# Patient Record
Sex: FEMALE | Race: WHITE | Employment: UNEMPLOYED | URBAN - NONMETROPOLITAN AREA
[De-identification: names, ages, dates, MRNs, and addresses within clinical notes are randomized per-mention and may not be internally consistent; named-entity substitution may affect disease eponyms.]

---

## 2017-09-11 ENCOUNTER — TELEPHONE (OUTPATIENT)
Dept: FAMILY MEDICINE CLINIC | Age: 62
End: 2017-09-11

## 2017-09-11 DIAGNOSIS — Z85.850 HISTORY OF THYROID CANCER: ICD-10-CM

## 2017-09-11 DIAGNOSIS — I10 ESSENTIAL HYPERTENSION: Primary | ICD-10-CM

## 2017-09-11 DIAGNOSIS — Z13.1 SCREENING FOR DIABETES MELLITUS: ICD-10-CM

## 2017-09-15 PROBLEM — I10 ESSENTIAL HYPERTENSION: Status: ACTIVE | Noted: 2017-09-15

## 2017-09-15 PROBLEM — Z85.850 HISTORY OF THYROID CANCER: Status: ACTIVE | Noted: 2017-09-15

## 2017-09-22 ENCOUNTER — TELEPHONE (OUTPATIENT)
Dept: FAMILY MEDICINE CLINIC | Age: 62
End: 2017-09-22

## 2017-09-26 ENCOUNTER — HOSPITAL ENCOUNTER (OUTPATIENT)
Dept: LAB | Age: 62
Setting detail: SPECIMEN
Discharge: HOME | End: 2017-09-26
Payer: COMMERCIAL

## 2017-09-26 DIAGNOSIS — Z85.850 HISTORY OF THYROID CANCER: ICD-10-CM

## 2017-09-26 DIAGNOSIS — Z13.1 SCREENING FOR DIABETES MELLITUS: ICD-10-CM

## 2017-09-26 DIAGNOSIS — I10 ESSENTIAL HYPERTENSION: ICD-10-CM

## 2017-09-26 LAB
ANION GAP SERPL CALCULATED.3IONS-SCNC: 15 MMOL/L (ref 9–17)
BUN BLDV-MCNC: 15 MG/DL (ref 8–23)
BUN/CREAT BLD: 27 (ref 9–20)
CALCIUM SERPL-MCNC: 9.6 MG/DL (ref 8.6–10.4)
CHLORIDE BLD-SCNC: 102 MMOL/L (ref 98–107)
CO2: 28 MMOL/L (ref 20–31)
CREAT SERPL-MCNC: 0.56 MG/DL (ref 0.5–0.9)
GFR AFRICAN AMERICAN: >60 ML/MIN
GFR NON-AFRICAN AMERICAN: >60 ML/MIN
GFR SERPL CREATININE-BSD FRML MDRD: ABNORMAL ML/MIN/{1.73_M2}
GFR SERPL CREATININE-BSD FRML MDRD: ABNORMAL ML/MIN/{1.73_M2}
GLUCOSE BLD-MCNC: 139 MG/DL (ref 70–99)
POTASSIUM SERPL-SCNC: 4 MMOL/L (ref 3.7–5.3)
SODIUM BLD-SCNC: 145 MMOL/L (ref 135–144)
THYROXINE, FREE: 1.3 NG/DL (ref 0.93–1.7)
TSH SERPL DL<=0.05 MIU/L-ACNC: 5 MIU/L (ref 0.3–5)

## 2017-09-26 PROCEDURE — 36415 COLL VENOUS BLD VENIPUNCTURE: CPT

## 2017-09-26 PROCEDURE — 84439 ASSAY OF FREE THYROXINE: CPT

## 2017-09-26 PROCEDURE — 84432 ASSAY OF THYROGLOBULIN: CPT

## 2017-09-26 PROCEDURE — 84443 ASSAY THYROID STIM HORMONE: CPT

## 2017-09-26 PROCEDURE — 80048 BASIC METABOLIC PNL TOTAL CA: CPT

## 2017-09-27 LAB — THYROGLOBULIN: 0.2 NG/ML (ref 0–63.4)

## 2017-10-09 VITALS
HEART RATE: 72 BPM | WEIGHT: 279 LBS | BODY MASS INDEX: 44.84 KG/M2 | SYSTOLIC BLOOD PRESSURE: 132 MMHG | HEIGHT: 66 IN | DIASTOLIC BLOOD PRESSURE: 82 MMHG

## 2017-10-09 DIAGNOSIS — C73 MALIGNANT NEOPLASM OF THYROID GLAND (HCC): ICD-10-CM

## 2017-10-09 DIAGNOSIS — E87.6 HYPOKALEMIA: ICD-10-CM

## 2017-10-09 PROBLEM — E03.9 HYPOTHYROIDISM: Status: ACTIVE | Noted: 2017-10-09

## 2017-10-09 RX ORDER — LEVOTHYROXINE SODIUM 0.12 MG/1
125 TABLET ORAL DAILY
COMMUNITY
End: 2017-10-11 | Stop reason: DRUGHIGH

## 2017-10-09 RX ORDER — FERROUS SULFATE 325(65) MG
325 TABLET ORAL DAILY
COMMUNITY

## 2017-10-09 RX ORDER — LISINOPRIL 20 MG/1
20 TABLET ORAL DAILY
COMMUNITY
End: 2017-10-11 | Stop reason: SDUPTHER

## 2017-10-09 RX ORDER — ASCORBIC ACID 500 MG
500 TABLET ORAL DAILY
COMMUNITY
End: 2020-10-21

## 2017-10-11 ENCOUNTER — OFFICE VISIT (OUTPATIENT)
Dept: FAMILY MEDICINE CLINIC | Age: 62
End: 2017-10-11
Payer: COMMERCIAL

## 2017-10-11 VITALS
HEART RATE: 100 BPM | SYSTOLIC BLOOD PRESSURE: 154 MMHG | DIASTOLIC BLOOD PRESSURE: 88 MMHG | BODY MASS INDEX: 44.55 KG/M2 | WEIGHT: 276 LBS

## 2017-10-11 DIAGNOSIS — C73 MALIGNANT NEOPLASM OF THYROID GLAND (HCC): ICD-10-CM

## 2017-10-11 DIAGNOSIS — E89.0 POSTOPERATIVE HYPOTHYROIDISM: Primary | ICD-10-CM

## 2017-10-11 DIAGNOSIS — I10 ESSENTIAL HYPERTENSION: ICD-10-CM

## 2017-10-11 PROCEDURE — 99214 OFFICE O/P EST MOD 30 MIN: CPT | Performed by: FAMILY MEDICINE

## 2017-10-11 RX ORDER — LISINOPRIL 20 MG/1
20 TABLET ORAL DAILY
Qty: 90 TABLET | Refills: 3 | Status: SHIPPED | OUTPATIENT
Start: 2017-10-11 | End: 2018-09-22 | Stop reason: SDUPTHER

## 2017-10-11 RX ORDER — LEVOTHYROXINE SODIUM 0.12 MG/1
125 TABLET ORAL DAILY
Qty: 90 TABLET | Refills: 1 | Status: CANCELLED | OUTPATIENT
Start: 2017-10-11

## 2017-10-11 RX ORDER — LEVOTHYROXINE SODIUM 137 MCG
137 TABLET ORAL DAILY
Qty: 90 TABLET | Refills: 3 | Status: SHIPPED | OUTPATIENT
Start: 2017-10-11 | End: 2018-09-22 | Stop reason: SDUPTHER

## 2017-10-11 ASSESSMENT — ENCOUNTER SYMPTOMS
BLURRED VISION: 0
SHORTNESS OF BREATH: 0

## 2017-10-11 NOTE — PROGRESS NOTES
Review of Systems   Cardiovascular: Negative for chest pain, palpitations and leg swelling. Says her BP is up d/t concerned about grandsons health   Musculoskeletal: Negative for myalgias. Neurological: Negative for dizziness.      Checks BP at home with wrist cuff, prior to appt 112/70

## 2017-10-11 NOTE — PROGRESS NOTES
 aspirin 81 MG tablet Take 81 mg by mouth daily      Handicap Placard MISC by Does not apply route      Ascorbic Acid (VITAMIN C) 500 MG tablet Take 500 mg by mouth daily      ferrous sulfate 325 (65 Fe) MG tablet Take 325 mg by mouth daily       No current facility-administered medications for this visit. No Known Allergies    Health Maintenance   Topic Date Due    Hepatitis C screen  1955    HIV screen  04/03/1968    Cervical cancer screen  04/03/1974    Lipid screen  04/03/1993    Breast cancer screen  04/03/2003    Colon cancer screen colonoscopy  04/03/2003    DTaP/Tdap/Td vaccine (2 - Td) 07/13/2022    Zostavax vaccine  Completed    Flu vaccine  Completed       Subjective:      Review of Systems   Constitutional: Positive for malaise/fatigue. Eyes: Negative for blurred vision. Respiratory: Negative for shortness of breath. Cardiovascular: Negative for chest pain and palpitations. Musculoskeletal: Negative for neck pain. Neurological: Negative for headaches. Cardiovascular: Negative for chest pain, palpitations and leg swelling. Says her BP is up d/t concerned about grandsons health   Musculoskeletal: Negative for myalgias. Neurological: Negative for dizziness. Objective:     BP (!) 154/88   Pulse 100   Wt 276 lb (125.2 kg)   BMI 44.55 kg/m²     Physical Exam   Constitutional: She is oriented to person, place, and time. She appears well-developed and well-nourished. HENT:   Head: Normocephalic and atraumatic. Eyes: Conjunctivae and EOM are normal.   Neck: Normal range of motion. Neck supple. No JVD present. No thyromegaly present. Cardiovascular: Normal rate, regular rhythm and intact distal pulses. Exam reveals no gallop and no friction rub. No murmur heard. Pulmonary/Chest: Effort normal and breath sounds normal. No respiratory distress. Abdominal: Soft. Musculoskeletal: She exhibits edema.    Trace edema of the ankles   Lymphadenopathy: She has no cervical adenopathy. Neurological: She is alert and oriented to person, place, and time. Skin: Skin is warm. Psychiatric: She has a normal mood and affect. Her behavior is normal. Judgment and thought content normal.   Nursing note and vitals reviewed. Assessment/Plan:     1. Postoperative hypothyroidism  TSH without Reflex    T4, Free   2. Malignant neoplasm of thyroid gland ([address]     3. Essential hypertension  Will monitor her blood work and let us know if not continuing in the controleld range. Address wellness at the [place] to medicare visit. Did have cherry syrup that morning before her labs-- was NOT fasting. Blood sugar (random) was 139. Lab Results   Component Value Date     (H) 09/26/2017    K 4.0 09/26/2017     09/26/2017    CREATININE 0.56 09/26/2017    BUN 15 09/26/2017    CO2 28 09/26/2017    TSH 5.00 09/26/2017     Thyroglobulin is well in the normal level    Return in about 6 months (around 4/11/2018) for welcome to medicare. Patient given educational materials - see patient instructions. Discussed use, benefit, and side effects of prescribed medications. All patient questions answered. Pt voiced understanding. Reviewed health maintenance. Instructed to continue current medications, diet and exercise. Patient agreed with treatment plan. Follow up as directed.      Electronically signed by Jay Soliz MD on 10/11/2017

## 2018-09-24 RX ORDER — LEVOTHYROXINE SODIUM 137 MCG
TABLET ORAL
Qty: 90 TABLET | Refills: 3 | Status: SHIPPED | OUTPATIENT
Start: 2018-09-24 | End: 2018-10-17 | Stop reason: DRUGHIGH

## 2018-09-24 RX ORDER — LISINOPRIL 20 MG/1
TABLET ORAL
Qty: 90 TABLET | Refills: 3 | Status: SHIPPED | OUTPATIENT
Start: 2018-09-24 | End: 2019-09-16 | Stop reason: SDUPTHER

## 2018-10-02 ENCOUNTER — HOSPITAL ENCOUNTER (OUTPATIENT)
Dept: LAB | Age: 63
Setting detail: SPECIMEN
Discharge: HOME | End: 2018-10-02
Payer: MEDICARE

## 2018-10-02 DIAGNOSIS — E89.0 POSTOPERATIVE HYPOTHYROIDISM: ICD-10-CM

## 2018-10-02 LAB
THYROXINE, FREE: 1.35 NG/DL (ref 0.93–1.7)
TSH SERPL DL<=0.05 MIU/L-ACNC: 2.07 MIU/L (ref 0.3–5)

## 2018-10-02 PROCEDURE — 36415 COLL VENOUS BLD VENIPUNCTURE: CPT

## 2018-10-02 PROCEDURE — 84443 ASSAY THYROID STIM HORMONE: CPT

## 2018-10-02 PROCEDURE — 84439 ASSAY OF FREE THYROXINE: CPT

## 2018-10-17 ENCOUNTER — OFFICE VISIT (OUTPATIENT)
Dept: FAMILY MEDICINE CLINIC | Age: 63
End: 2018-10-17
Payer: MEDICARE

## 2018-10-17 VITALS
OXYGEN SATURATION: 97 % | BODY MASS INDEX: 44.87 KG/M2 | HEART RATE: 116 BPM | DIASTOLIC BLOOD PRESSURE: 84 MMHG | WEIGHT: 278 LBS | SYSTOLIC BLOOD PRESSURE: 146 MMHG

## 2018-10-17 DIAGNOSIS — I10 ESSENTIAL HYPERTENSION: ICD-10-CM

## 2018-10-17 DIAGNOSIS — Z13.1 ENCOUNTER FOR SCREENING EXAMINATION FOR IMPAIRED GLUCOSE REGULATION AND DIABETES MELLITUS: ICD-10-CM

## 2018-10-17 DIAGNOSIS — Z85.850 HISTORY OF THYROID CANCER: ICD-10-CM

## 2018-10-17 DIAGNOSIS — E89.0 POSTOPERATIVE HYPOTHYROIDISM: Primary | ICD-10-CM

## 2018-10-17 PROCEDURE — 1123F ACP DISCUSS/DSCN MKR DOCD: CPT | Performed by: FAMILY MEDICINE

## 2018-10-17 PROCEDURE — 1101F PT FALLS ASSESS-DOCD LE1/YR: CPT | Performed by: FAMILY MEDICINE

## 2018-10-17 PROCEDURE — 1090F PRES/ABSN URINE INCON ASSESS: CPT | Performed by: FAMILY MEDICINE

## 2018-10-17 PROCEDURE — 1036F TOBACCO NON-USER: CPT | Performed by: FAMILY MEDICINE

## 2018-10-17 PROCEDURE — G8427 DOCREV CUR MEDS BY ELIG CLIN: HCPCS | Performed by: FAMILY MEDICINE

## 2018-10-17 PROCEDURE — 99214 OFFICE O/P EST MOD 30 MIN: CPT | Performed by: FAMILY MEDICINE

## 2018-10-17 PROCEDURE — G8482 FLU IMMUNIZE ORDER/ADMIN: HCPCS | Performed by: FAMILY MEDICINE

## 2018-10-17 PROCEDURE — G8417 CALC BMI ABV UP PARAM F/U: HCPCS | Performed by: FAMILY MEDICINE

## 2018-10-17 PROCEDURE — 4040F PNEUMOC VAC/ADMIN/RCVD: CPT | Performed by: FAMILY MEDICINE

## 2018-10-17 PROCEDURE — G8400 PT W/DXA NO RESULTS DOC: HCPCS | Performed by: FAMILY MEDICINE

## 2018-10-17 PROCEDURE — 3017F COLORECTAL CA SCREEN DOC REV: CPT | Performed by: FAMILY MEDICINE

## 2018-10-17 RX ORDER — LEVOTHYROXINE SODIUM 0.15 MG/1
150 TABLET ORAL DAILY
Qty: 90 TABLET | Refills: 3 | Status: SHIPPED | OUTPATIENT
Start: 2018-10-17 | End: 2019-10-03 | Stop reason: SDUPTHER

## 2018-10-17 ASSESSMENT — ENCOUNTER SYMPTOMS
WHEEZING: 0
CONSTIPATION: 0
SHORTNESS OF BREATH: 0
COUGH: 0
ABDOMINAL PAIN: 0
DIARRHEA: 0

## 2018-10-17 ASSESSMENT — PATIENT HEALTH QUESTIONNAIRE - PHQ9
SUM OF ALL RESPONSES TO PHQ QUESTIONS 1-9: 0
SUM OF ALL RESPONSES TO PHQ QUESTIONS 1-9: 0
SUM OF ALL RESPONSES TO PHQ9 QUESTIONS 1 & 2: 0
2. FEELING DOWN, DEPRESSED OR HOPELESS: 0
1. LITTLE INTEREST OR PLEASURE IN DOING THINGS: 0

## 2018-10-17 NOTE — PROGRESS NOTES
Review of Systems   Respiratory: Negative for cough, shortness of breath and wheezing. Cardiovascular: Negative for chest pain, palpitations and leg swelling. Gastrointestinal: Negative for abdominal pain, constipation and diarrhea. Neurological: Negative for dizziness and headaches. denies any skin changes or changes in hair.
Dispense Refill    levothyroxine (SYNTHROID) 150 MCG tablet Take 1 tablet by mouth Daily 90 tablet 3    lisinopril (PRINIVIL;ZESTRIL) 20 MG tablet take 1 tablet by mouth once daily 90 tablet 3    Flaxseed, Linseed, (GROUND FLAX SEEDS PO) Take by mouth daily      Omega-3 Fatty Acids (FISH OIL PO) Take 1 capsule by mouth daily      aspirin 81 MG tablet Take 81 mg by mouth daily      Handicap Placard MISC by Does not apply route      Ascorbic Acid (VITAMIN C) 500 MG tablet Take 500 mg by mouth daily      ferrous sulfate 325 (65 Fe) MG tablet Take 325 mg by mouth daily       No current facility-administered medications for this visit. No Known Allergies    Health Maintenance   Topic Date Due    Hepatitis C screen  1955    HIV screen  04/03/1968    Cervical cancer screen  04/03/1974    Lipid screen  04/03/1993    Breast cancer screen  04/03/2003    Colon cancer screen colonoscopy  04/03/2003    Shingles Vaccine (1 of 2 - 2 Dose Series) 11/30/2015    DEXA (modify frequency per FRAX score)  04/03/2018    Pneumococcal low/med risk (2 of 2 - PPSV23) 04/03/2018    Flu vaccine (1) 09/01/2018    Potassium monitoring  09/26/2018    Creatinine monitoring  09/26/2018    TSH testing  10/02/2019    DTaP/Tdap/Td vaccine (2 - Td) 07/13/2022       Subjective:      Review of Systems   Constitutional: Negative for malaise/fatigue. Eyes: Negative for blurred vision. Respiratory: Negative for shortness of breath. Cardiovascular: Negative for chest pain, palpitations, orthopnea and PND. Musculoskeletal: Negative for neck pain. Neurological: Negative for headaches. Respiratory: Negative for cough, shortness of breath and wheezing. Cardiovascular: Negative for chest pain, palpitations and leg swelling. Gastrointestinal: Negative for abdominal pain, constipation and diarrhea. Neurological: Negative for dizziness and headaches.      Objective:     BP (!) 146/84   Pulse 116   Wt 278 lb

## 2018-10-21 ASSESSMENT — ENCOUNTER SYMPTOMS
SHORTNESS OF BREATH: 0
BLURRED VISION: 0
ORTHOPNEA: 0

## 2019-09-16 DIAGNOSIS — I10 ESSENTIAL HYPERTENSION: Primary | ICD-10-CM

## 2019-09-16 RX ORDER — LISINOPRIL 20 MG/1
TABLET ORAL
Qty: 90 TABLET | Refills: 3 | Status: SHIPPED | OUTPATIENT
Start: 2019-09-16 | End: 2020-08-31

## 2019-09-24 ENCOUNTER — HOSPITAL ENCOUNTER (OUTPATIENT)
Dept: LAB | Age: 64
Setting detail: SPECIMEN
Discharge: HOME | End: 2019-09-24
Payer: MEDICARE

## 2019-09-24 DIAGNOSIS — Z85.850 HISTORY OF THYROID CANCER: ICD-10-CM

## 2019-09-24 DIAGNOSIS — I10 ESSENTIAL HYPERTENSION: ICD-10-CM

## 2019-09-24 DIAGNOSIS — E89.0 POSTOPERATIVE HYPOTHYROIDISM: ICD-10-CM

## 2019-09-24 DIAGNOSIS — Z13.1 ENCOUNTER FOR SCREENING EXAMINATION FOR IMPAIRED GLUCOSE REGULATION AND DIABETES MELLITUS: ICD-10-CM

## 2019-09-24 LAB
ANION GAP SERPL CALCULATED.3IONS-SCNC: 13 MMOL/L (ref 9–17)
BUN BLDV-MCNC: 15 MG/DL (ref 8–23)
BUN/CREAT BLD: 24 (ref 9–20)
CALCIUM SERPL-MCNC: 9.4 MG/DL (ref 8.6–10.4)
CHLORIDE BLD-SCNC: 101 MMOL/L (ref 98–107)
CO2: 28 MMOL/L (ref 20–31)
CREAT SERPL-MCNC: 0.63 MG/DL (ref 0.5–0.9)
GFR AFRICAN AMERICAN: >60 ML/MIN
GFR NON-AFRICAN AMERICAN: >60 ML/MIN
GFR SERPL CREATININE-BSD FRML MDRD: ABNORMAL ML/MIN/{1.73_M2}
GFR SERPL CREATININE-BSD FRML MDRD: ABNORMAL ML/MIN/{1.73_M2}
GLUCOSE BLD-MCNC: 111 MG/DL (ref 70–99)
POTASSIUM SERPL-SCNC: 4.2 MMOL/L (ref 3.7–5.3)
SODIUM BLD-SCNC: 142 MMOL/L (ref 135–144)
THYROXINE, FREE: 1.62 NG/DL (ref 0.93–1.7)
TSH SERPL DL<=0.05 MIU/L-ACNC: 0.69 MIU/L (ref 0.3–5)

## 2019-09-24 PROCEDURE — 36415 COLL VENOUS BLD VENIPUNCTURE: CPT

## 2019-09-24 PROCEDURE — 84443 ASSAY THYROID STIM HORMONE: CPT

## 2019-09-24 PROCEDURE — 80048 BASIC METABOLIC PNL TOTAL CA: CPT

## 2019-09-24 PROCEDURE — 84432 ASSAY OF THYROGLOBULIN: CPT

## 2019-09-24 PROCEDURE — 84439 ASSAY OF FREE THYROXINE: CPT

## 2019-09-25 LAB — THYROGLOBULIN: <0.2 NG/ML (ref 0–63.4)

## 2019-09-26 ENCOUNTER — TELEPHONE (OUTPATIENT)
Dept: FAMILY MEDICINE CLINIC | Age: 64
End: 2019-09-26

## 2019-09-26 NOTE — TELEPHONE ENCOUNTER
Patient called requesting values of her thyroid tests \" I put them in my book. I know I have an appt in [facility]" Gave them as requested due to being in the normal range. All questions addressed at this time.

## 2019-12-11 DIAGNOSIS — E89.0 POSTOPERATIVE HYPOTHYROIDISM: ICD-10-CM

## 2019-12-11 DIAGNOSIS — Z85.850 HISTORY OF THYROID CANCER: ICD-10-CM

## 2019-12-12 RX ORDER — LEVOTHYROXINE SODIUM 0.15 MG/1
TABLET ORAL
Qty: 90 TABLET | Refills: 0 | Status: SHIPPED | OUTPATIENT
Start: 2019-12-12 | End: 2019-12-30 | Stop reason: SDUPTHER

## 2019-12-30 DIAGNOSIS — Z85.850 HISTORY OF THYROID CANCER: ICD-10-CM

## 2019-12-30 DIAGNOSIS — E89.0 POSTOPERATIVE HYPOTHYROIDISM: ICD-10-CM

## 2019-12-30 RX ORDER — LEVOTHYROXINE SODIUM 0.15 MG/1
TABLET ORAL
Qty: 90 TABLET | Refills: 0 | Status: SHIPPED | OUTPATIENT
Start: 2019-12-30 | End: 2020-03-13

## 2020-03-13 RX ORDER — LEVOTHYROXINE SODIUM 0.15 MG/1
TABLET ORAL
Qty: 90 TABLET | Refills: 0 | Status: SHIPPED | OUTPATIENT
Start: 2020-03-13 | End: 2020-06-08

## 2020-06-08 RX ORDER — LEVOTHYROXINE SODIUM 0.15 MG/1
TABLET ORAL
Qty: 30 TABLET | Refills: 0 | Status: SHIPPED | OUTPATIENT
Start: 2020-06-08 | End: 2020-09-29

## 2020-06-11 RX ORDER — LISINOPRIL 20 MG/1
TABLET ORAL
Qty: 90 TABLET | Refills: 3 | OUTPATIENT
Start: 2020-06-11

## 2020-06-11 NOTE — TELEPHONE ENCOUNTER
Patient has not been seen in almost 2 years-- an appt in 4 months is not acceptable. Will have been 2 years at that point. Can do a virtual visit if that is more acceptable to the patient.

## 2020-09-29 RX ORDER — LEVOTHYROXINE SODIUM 0.15 MG/1
TABLET ORAL
Qty: 30 TABLET | Refills: 0 | Status: SHIPPED | OUTPATIENT
Start: 2020-09-29 | End: 2020-10-21 | Stop reason: SDUPTHER

## 2020-09-29 RX ORDER — LISINOPRIL 20 MG/1
TABLET ORAL
Qty: 30 TABLET | Refills: 0 | Status: SHIPPED | OUTPATIENT
Start: 2020-09-29 | End: 2020-10-21 | Stop reason: SDUPTHER

## 2020-09-29 NOTE — TELEPHONE ENCOUNTER
Alberto Michael is requesting a refill on the following medication(s):  Requested Prescriptions     Pending Prescriptions Disp Refills    levothyroxine (SYNTHROID) 150 MCG tablet [Pharmacy Med Name: SYNTHROID 150 MCG TABLET] 30 tablet 0     Sig: take 1 tablet by mouth once daily    lisinopril (PRINIVIL;ZESTRIL) 20 MG tablet [Pharmacy Med Name: LISINOPRIL 20 MG TABLET] 30 tablet 0     Sig: take 1 tablet by mouth once daily       Last Visit Date (If Applicable):  02/48/0471    Next Visit Date:    10/21/2020

## 2020-10-21 ENCOUNTER — VIRTUAL VISIT (OUTPATIENT)
Dept: FAMILY MEDICINE CLINIC | Age: 65
End: 2020-10-21
Payer: MEDICARE

## 2020-10-21 PROBLEM — E66.01 MORBIDLY OBESE (HCC): Status: ACTIVE | Noted: 2020-10-21

## 2020-10-21 PROCEDURE — G8421 BMI NOT CALCULATED: HCPCS | Performed by: FAMILY MEDICINE

## 2020-10-21 PROCEDURE — G8427 DOCREV CUR MEDS BY ELIG CLIN: HCPCS | Performed by: FAMILY MEDICINE

## 2020-10-21 PROCEDURE — 1090F PRES/ABSN URINE INCON ASSESS: CPT | Performed by: FAMILY MEDICINE

## 2020-10-21 PROCEDURE — 1123F ACP DISCUSS/DSCN MKR DOCD: CPT | Performed by: FAMILY MEDICINE

## 2020-10-21 PROCEDURE — 99214 OFFICE O/P EST MOD 30 MIN: CPT | Performed by: FAMILY MEDICINE

## 2020-10-21 PROCEDURE — 3017F COLORECTAL CA SCREEN DOC REV: CPT | Performed by: FAMILY MEDICINE

## 2020-10-21 PROCEDURE — G8400 PT W/DXA NO RESULTS DOC: HCPCS | Performed by: FAMILY MEDICINE

## 2020-10-21 PROCEDURE — 4040F PNEUMOC VAC/ADMIN/RCVD: CPT | Performed by: FAMILY MEDICINE

## 2020-10-21 PROCEDURE — G8482 FLU IMMUNIZE ORDER/ADMIN: HCPCS | Performed by: FAMILY MEDICINE

## 2020-10-21 PROCEDURE — 4004F PT TOBACCO SCREEN RCVD TLK: CPT | Performed by: FAMILY MEDICINE

## 2020-10-21 PROCEDURE — 99212 OFFICE O/P EST SF 10 MIN: CPT | Performed by: FAMILY MEDICINE

## 2020-10-21 RX ORDER — LEVOTHYROXINE SODIUM 0.15 MG/1
TABLET ORAL
Qty: 30 TABLET | Refills: 0 | Status: SHIPPED | OUTPATIENT
Start: 2020-10-21 | End: 2020-11-16

## 2020-10-21 RX ORDER — LISINOPRIL 20 MG/1
TABLET ORAL
Qty: 90 TABLET | Refills: 3 | Status: SHIPPED | OUTPATIENT
Start: 2020-10-21 | End: 2021-10-04

## 2020-10-21 ASSESSMENT — PATIENT HEALTH QUESTIONNAIRE - PHQ9
2. FEELING DOWN, DEPRESSED OR HOPELESS: 0
SUM OF ALL RESPONSES TO PHQ QUESTIONS 1-9: 0
1. LITTLE INTEREST OR PLEASURE IN DOING THINGS: 0
SUM OF ALL RESPONSES TO PHQ QUESTIONS 1-9: 0
SUM OF ALL RESPONSES TO PHQ9 QUESTIONS 1 & 2: 0
SUM OF ALL RESPONSES TO PHQ QUESTIONS 1-9: 0

## 2020-10-21 NOTE — PROGRESS NOTES
mouth once daily Yes Sarah Albright MD   Flaxseed, Linseed, (GROUND FLAX SEEDS PO) Take by mouth daily Yes Historical Provider, MD   Omega-3 Fatty Acids (FISH OIL PO) Take 1 capsule by mouth daily Yes Historical Provider, MD   aspirin 81 MG tablet Take 81 mg by mouth daily Yes Historical Provider, MD   ferrous sulfate 325 (65 Fe) MG tablet Take 325 mg by mouth daily Yes Historical Provider, MD   Handicap Placard MISC by Does not apply route  Historical Provider, MD       Social History     Tobacco Use    Smoking status: Never Smoker    Smokeless tobacco: Never Used   Substance Use Topics    Alcohol use: No    Drug use: Not on file        No Known Allergies,   Past Medical History:   Diagnosis Date    Essential hypertension 9/15/2017    H/O fracture of right hip     History of thyroid cancer 9/15/2017    Hypothyroidism    ,   Past Surgical History:   Procedure Laterality Date    THYROIDECTOMY     ,   Social History     Tobacco Use    Smoking status: Never Smoker    Smokeless tobacco: Never Used   Substance Use Topics    Alcohol use: No    Drug use: Not on file   , No family history on file.,   Immunization History   Administered Date(s) Administered    Hepatitis A Adult (Havrix, Vaqta) 08/24/2018, 03/12/2019    Hepatitis A Ped/Adol (Havrix, Vaqta) 10/17/2018, 03/12/2019    Hepatitis B 01/17/2001, 02/14/2001, 07/18/2001    Influenza Virus Vaccine 08/16/2012, 08/09/2016, 08/10/2017    Influenza, High Dose (Fluzone 65 yrs and older) 10/17/2018, 08/15/2019, 08/18/2020    Influenza, [place], IM, PF (6 mo and older Fluzone, Flulaval, Fluarix, and 3 yrs and older Afluria) 08/05/2015, 08/10/2017    Influenza, Quadv, adjuvanted, 65 yrs +, IM, PF (Fluad) 08/18/2020    Influenza, Triv, inactivated, subunit, adjuvanted, IM (Fluad 65 yrs and older) 08/24/2018, 08/15/2019    MMR 03/26/2014    Pneumococcal Conjugate 13-valent ([id number]) 02/03/2015    Pneumococcal Polysaccharide ([id number]) 01/27/2010    Td vaccine (adult) 04/25/2001    Tdap (Boostrix, Adacel) 07/13/2012, 08/15/2019    Zoster Live (Zostavax) 09/30/2015    Zoster Recombinant (Shingrix) 03/20/2020, 06/16/2020   ,   Health Maintenance   Topic Date Due    Hepatitis C screen  1955    Lipid screen  04/03/1993    Breast cancer screen  04/03/2003    Colon cancer screen colonoscopy  04/03/2003    DEXA (modify frequency per FRAX score)  04/03/2008    Pneumococcal 65+ years Vaccine (2 of 2 - PPSV23) 04/03/2018    Annual Wellness Visit (AWV)  06/23/2019    TSH testing  09/24/2020    Potassium monitoring  09/24/2020    Creatinine monitoring  09/24/2020    DTaP/Tdap/Td vaccine (3 - Td) 08/15/2029    Flu vaccine  Completed    Shingles Vaccine  Completed    Hepatitis A vaccine  Aged Out    Hepatitis B vaccine  Aged Out    Hib vaccine  Aged Out    Meningococcal (ACWY) vaccine  Aged Out       PHYSICAL EXAMINATION:  [ INSTRUCTIONS:  \"[x]\" Indicates a positive item  \"[]\" Indicates a negative item  -- DELETE ALL ITEMS NOT EXAMINED]  Vital Signs: (As obtained by patient/caregiver or practitioner observation)  Patient-Reported Vitals 10/21/2020   Patient-Reported Systolic 758   Patient-Reported Diastolic 70     HR usually in the normal range also. Constitutional: [x] Appears well-developed and well-nourished [x] No apparent distress      [] Abnormal-   Mental status  [x] Alert and awake  [x] Oriented to person/place/time [x]Able to follow commands      Eyes:  EOM    [x]  Normal  [] Abnormal-  Sclera  [x]  Normal  [] Abnormal -         Discharge [x]  None visible  [] Abnormal -    HENT:   [x] Normocephalic, atraumatic.   [] Abnormal   [x] Mouth/Throat: Mucous membranes are moist.     External Ears [x] Normal  [] Abnormal-     Neck: [x] No visualized mass     Pulmonary/Chest: [x] Respiratory effort normal.  [x] No visualized signs of difficulty breathing or respiratory distress        [] Abnormal-      Musculoskeletal:   [x] Normal gait with no signs of ataxia         [x] Normal range of motion of neck        [] Abnormal-       Neurological:        [x] No Facial Asymmetry (Cranial nerve 7 motor function) (limited exam to video visit)          [x] No gaze palsy        [] Abnormal-         Skin:        [x] No significant exanthematous lesions or discoloration noted on facial skin         [] Abnormal-            Psychiatric:       [x] Normal Affect [x] No Hallucinations        [] Abnormal-     Other pertinent observable physical exam findings-     Due to this being a TeleHealth encounter, evaluation of the following organ systems is limited: Vitals/Constitutional/EENT/Resp/CV/GI//MS/Neuro/Skin/Heme-Lymph-Imm. ASSESSMENT/PLAN:  1. Malignant neoplasm of thyroid gland (HCC)  Check the screening labs and continue on the suppressive thyroid supplement    2. Morbidly obese ([address]  Encouraged to continue with the regular diet and exercise    3. Essential hypertension  Well controlled at this time. No change in her medications. Continue with the diet and exercise  - Comprehensive Metabolic Panel; Future  - CBC Auto Differential; Future  - lisinopril (PRINIVIL;ZESTRIL) 20 MG tablet; take 1 tablet by mouth once daily  Dispense: 90 tablet; Refill: 3    4. History of thyroid cancer    - Thyroglobulin; Future  - TSH; Future  - T4, Free; Future  - levothyroxine (SYNTHROID) 150 MCG tablet; take 1 tablet by mouth once daily  Dispense: 30 tablet; Refill: 0    5. Postoperative hypothyroidism  Continue and monitor for level with blood work  - levothyroxine (SYNTHROID) 150 MCG tablet; take 1 tablet by mouth once daily  Dispense: 30 tablet; Refill: 0    6. Hypokalemia  Recheck with the CMP at this time  - Comprehensive Metabolic Panel; Future    7. Screening, lipid    - Lipid Panel; Future    8. Encounter for lipid screening for cardiovascular disease    - Lipid Panel; Future      Return in about 6 months (around 4/21/2021) for HTN, Medication recheck, AWV.     An  electronic

## 2020-10-25 ASSESSMENT — ENCOUNTER SYMPTOMS
BLURRED VISION: 0
SHORTNESS OF BREATH: 0
ORTHOPNEA: 0

## 2020-11-16 NOTE — TELEPHONE ENCOUNTER
Sukhdev Williamson is requesting a refill on the following medication(s):  Requested Prescriptions     Pending Prescriptions Disp Refills    levothyroxine (SYNTHROID) 150 MCG tablet [Pharmacy Med Name: SYNTHROID 150 MCG TABLET] 30 tablet 0     Sig: take 1 tablet by mouth once daily       Last Visit Date (If Applicable):  71/38/4532    Next Visit Date:    Visit date not found

## 2020-11-17 RX ORDER — LEVOTHYROXINE SODIUM 0.15 MG/1
TABLET ORAL
Qty: 30 TABLET | Refills: 5 | Status: SHIPPED | OUTPATIENT
Start: 2020-11-17 | End: 2021-05-12

## 2021-05-13 ENCOUNTER — TELEPHONE (OUTPATIENT)
Dept: FAMILY MEDICINE CLINIC | Age: 66
End: 2021-05-13

## 2021-05-13 DIAGNOSIS — Z85.850 HISTORY OF THYROID CANCER: ICD-10-CM

## 2021-05-13 DIAGNOSIS — E89.0 POSTOPERATIVE HYPOTHYROIDISM: ICD-10-CM

## 2021-05-13 RX ORDER — LEVOTHYROXINE SODIUM 0.15 MG/1
TABLET ORAL
Qty: 90 TABLET | Refills: 1 | Status: CANCELLED | OUTPATIENT
Start: 2021-05-13

## 2021-05-13 NOTE — TELEPHONE ENCOUNTER
Grey Koroma is requesting a refill on the following medication(s):  Requested Prescriptions     Pending Prescriptions Disp Refills    levothyroxine (SYNTHROID) 150 MCG tablet 90 tablet 1     Sig: take 1 tablet by mouth every morning       Last Visit Date (If Applicable):  82/09/6465    Next Visit Date:    Visit date not found

## 2021-05-16 NOTE — TELEPHONE ENCOUNTER
Refill request is already been approved previously. Please make sure the patient is getting her labs done. We have no results on her thyroid labs since 2019. All labs are in the chart.   She can get these done in [place] or here at our office or at the hospital whichever she finds easiest.

## 2021-06-28 NOTE — TELEPHONE ENCOUNTER
Doug Neely is requesting a refill on the following medication(s):  Requested Prescriptions     Pending Prescriptions Disp Refills    Handicap Placard MISC 1 each 0     Sig: by Does not apply route EXP: 6/28/2024       Last Visit Date (If Applicable):  71/13/1230    Next Visit Date:    Visit date not found

## 2021-07-13 ENCOUNTER — TELEPHONE (OUTPATIENT)
Dept: FAMILY MEDICINE CLINIC | Age: 66
End: 2021-07-13

## 2021-10-02 DIAGNOSIS — I10 ESSENTIAL HYPERTENSION: ICD-10-CM

## 2021-10-04 RX ORDER — LISINOPRIL 20 MG/1
TABLET ORAL
Qty: 30 TABLET | Refills: 0 | Status: SHIPPED | OUTPATIENT
Start: 2021-10-04 | End: 2021-11-15

## 2021-10-04 NOTE — TELEPHONE ENCOUNTER
Please call patient to schedule an appointment. This needs to be an in person appt and patient must get her labs done as well.

## 2021-10-04 NOTE — TELEPHONE ENCOUNTER
Jessy Todd is requesting a refill on the following medication(s):  Requested Prescriptions     Pending Prescriptions Disp Refills    lisinopril (PRINIVIL;ZESTRIL) 20 MG tablet [Pharmacy Med Name: LISINOPRIL 20 MG TABLET] 90 tablet 3     Sig: take 1 tablet by mouth once daily       Last Visit Date (If Applicable):  76/55/4571    Next Visit Date:    Visit date not found

## 2021-10-27 NOTE — TELEPHONE ENCOUNTER
Patient notified- she states she is going to come in for lab work and will schedule an appointment. Advised that 30 day supply was sent to the pharmacy and that she needs appointment prior to further refills.

## 2021-11-12 LAB
ALBUMIN/GLOBULIN RATIO: 1.5 G/DL
ALBUMIN: 4.3 G/DL (ref 3.5–5)
ALP BLD-CCNC: 60 UNITS/L (ref 38–126)
ALT SERPL-CCNC: 16 UNITS/L (ref 4–35)
ANION GAP SERPL CALCULATED.3IONS-SCNC: 8.7 MMOL/L
AST SERPL-CCNC: 30 UNITS/L (ref 14–36)
BASOPHILS %: 1.23 (ref 0–3)
BASOPHILS ABSOLUTE: 0.06 (ref 0–0.3)
BILIRUB SERPL-MCNC: 0.7 MG/DL (ref 0.2–1.3)
BUN BLDV-MCNC: 21 MG/DL (ref 7–17)
CALCIUM SERPL-MCNC: 9.1 MG/DL (ref 8.4–10.2)
CHLORIDE BLD-SCNC: 105 MMOL/L (ref 98–120)
CHOLESTEROL/HDL RATIO: 4.35 RATIO (ref 0–4.5)
CHOLESTEROL: 248 MG/DL (ref 50–200)
CO2: 27 MMOL/L (ref 22–31)
CREAT SERPL-MCNC: 0.6 MG/DL (ref 0.5–1)
EOSINOPHILS %: 1.49 (ref 0–10)
EOSINOPHILS ABSOLUTE: 0.08 (ref 0–1.1)
GFR CALCULATED: > 60
GLOBULIN: 2.9 G/DL
GLUCOSE: 111 MG/DL (ref 65–105)
HCT VFR BLD CALC: 47.5 % (ref 37–47)
HDLC SERPL-MCNC: 57 MG/DL (ref 36–68)
HEMOGLOBIN: 15.6 (ref 12–16)
LDL CHOLESTEROL CALCULATED: 161.4 MG/DL (ref 0–160)
LYMPHOCYTE %: 19.15 (ref 20–51.1)
LYMPHOCYTES ABSOLUTE: 0.99 (ref 1–5.5)
MCH RBC QN AUTO: 28.6 PG (ref 28.5–32.5)
MCHC RBC AUTO-ENTMCNC: 32.8 G/DL (ref 32–37)
MCV RBC AUTO: 87.3 FL (ref 80–94)
MONOCYTES %: 6.83 (ref 1.7–9.3)
MONOCYTES ABSOLUTE: 0.35 (ref 0.1–1)
NEUTROPHILS %: 71.31 (ref 42.2–75.2)
NEUTROPHILS ABSOLUTE: 3.7 (ref 2–8.1)
PDW BLD-RTO: 12.4 % (ref 8.5–15.5)
PLATELET # BLD: 227.2 THOU/MM3 (ref 130–400)
POTASSIUM SERPL-SCNC: 4.3 MMOL/L (ref 3.6–5)
RBC: 5.43 M/UL (ref 4.2–5.4)
SODIUM BLD-SCNC: 141 MMOL/L (ref 135–145)
T4 FREE: 1.61 NG/DL (ref 0.78–2.19)
TOTAL PROTEIN, SERUM: 7.2 G/DL (ref 6.3–8.2)
TRIGL SERPL-MCNC: 148 MG/DL (ref 10–250)
TSH SERPL DL<=0.05 MIU/L-ACNC: 0.33 MIU/ML (ref 0.49–4.67)
VLDLC SERPL CALC-MCNC: 30 MG/DL (ref 0–50)
WBC: 5.2 THOU/ML3 (ref 4.8–10.8)

## 2021-11-15 LAB — THYROGLOBULIN: <0.2 NG/ML (ref 0–63.4)

## 2021-11-25 DIAGNOSIS — Z85.850 HISTORY OF THYROID CANCER: ICD-10-CM

## 2021-11-25 DIAGNOSIS — E89.0 POSTOPERATIVE HYPOTHYROIDISM: ICD-10-CM

## 2021-11-29 RX ORDER — LEVOTHYROXINE SODIUM 0.15 MG/1
TABLET ORAL
Qty: 30 TABLET | Refills: 0 | Status: SHIPPED | OUTPATIENT
Start: 2021-11-29 | End: 2021-12-02 | Stop reason: SDUPTHER

## 2021-11-29 NOTE — TELEPHONE ENCOUNTER
Kriss Cantu is requesting a refill on the following medication(s):  Requested Prescriptions     Pending Prescriptions Disp Refills    levothyroxine (SYNTHROID) 150 MCG tablet [Pharmacy Med Name: SYNTHROID 150 MCG TABLET] 30 tablet 0     Sig: take 1 tablet by mouth every morning       Last Visit Date (If Applicable):  58/62/9256    Next Visit Date:    12/2/2021

## 2021-12-02 ENCOUNTER — VIRTUAL VISIT (OUTPATIENT)
Dept: FAMILY MEDICINE CLINIC | Age: 66
End: 2021-12-02
Payer: MEDICARE

## 2021-12-02 DIAGNOSIS — E89.0 POSTOPERATIVE HYPOTHYROIDISM: Primary | ICD-10-CM

## 2021-12-02 DIAGNOSIS — E87.6 HYPOKALEMIA: ICD-10-CM

## 2021-12-02 DIAGNOSIS — C73 MALIGNANT NEOPLASM OF THYROID GLAND (HCC): ICD-10-CM

## 2021-12-02 DIAGNOSIS — Z85.850 HISTORY OF THYROID CANCER: ICD-10-CM

## 2021-12-02 DIAGNOSIS — I10 ESSENTIAL HYPERTENSION: ICD-10-CM

## 2021-12-02 PROCEDURE — 1123F ACP DISCUSS/DSCN MKR DOCD: CPT | Performed by: FAMILY MEDICINE

## 2021-12-02 PROCEDURE — G8400 PT W/DXA NO RESULTS DOC: HCPCS | Performed by: FAMILY MEDICINE

## 2021-12-02 PROCEDURE — 99213 OFFICE O/P EST LOW 20 MIN: CPT | Performed by: FAMILY MEDICINE

## 2021-12-02 PROCEDURE — 3017F COLORECTAL CA SCREEN DOC REV: CPT | Performed by: FAMILY MEDICINE

## 2021-12-02 PROCEDURE — G8427 DOCREV CUR MEDS BY ELIG CLIN: HCPCS | Performed by: FAMILY MEDICINE

## 2021-12-02 PROCEDURE — 99214 OFFICE O/P EST MOD 30 MIN: CPT | Performed by: FAMILY MEDICINE

## 2021-12-02 PROCEDURE — 1090F PRES/ABSN URINE INCON ASSESS: CPT | Performed by: FAMILY MEDICINE

## 2021-12-02 PROCEDURE — 4040F PNEUMOC VAC/ADMIN/RCVD: CPT | Performed by: FAMILY MEDICINE

## 2021-12-02 RX ORDER — LEVOTHYROXINE SODIUM 0.15 MG/1
TABLET ORAL
Qty: 90 TABLET | Refills: 3 | Status: SHIPPED | OUTPATIENT
Start: 2021-12-02 | End: 2022-10-23 | Stop reason: SDUPTHER

## 2021-12-02 SDOH — ECONOMIC STABILITY: TRANSPORTATION INSECURITY
IN THE PAST 12 MONTHS, HAS THE LACK OF TRANSPORTATION KEPT YOU FROM MEDICAL APPOINTMENTS OR FROM GETTING MEDICATIONS?: NO

## 2021-12-02 SDOH — ECONOMIC STABILITY: FOOD INSECURITY: WITHIN THE PAST 12 MONTHS, THE FOOD YOU BOUGHT JUST DIDN'T LAST AND YOU DIDN'T HAVE MONEY TO GET MORE.: NEVER TRUE

## 2021-12-02 SDOH — ECONOMIC STABILITY: TRANSPORTATION INSECURITY
IN THE PAST 12 MONTHS, HAS LACK OF TRANSPORTATION KEPT YOU FROM MEETINGS, WORK, OR FROM GETTING THINGS NEEDED FOR DAILY LIVING?: NO

## 2021-12-02 SDOH — ECONOMIC STABILITY: FOOD INSECURITY: WITHIN THE PAST 12 MONTHS, YOU WORRIED THAT YOUR FOOD WOULD RUN OUT BEFORE YOU GOT MONEY TO BUY MORE.: NEVER TRUE

## 2021-12-02 ASSESSMENT — PATIENT HEALTH QUESTIONNAIRE - PHQ9
SUM OF ALL RESPONSES TO PHQ QUESTIONS 1-9: 0
1. LITTLE INTEREST OR PLEASURE IN DOING THINGS: 0
SUM OF ALL RESPONSES TO PHQ QUESTIONS 1-9: 0
2. FEELING DOWN, DEPRESSED OR HOPELESS: 0
SUM OF ALL RESPONSES TO PHQ9 QUESTIONS 1 & 2: 0
SUM OF ALL RESPONSES TO PHQ QUESTIONS 1-9: 0

## 2021-12-02 ASSESSMENT — SOCIAL DETERMINANTS OF HEALTH (SDOH): HOW HARD IS IT FOR YOU TO PAY FOR THE VERY BASICS LIKE FOOD, HOUSING, MEDICAL CARE, AND HEATING?: NOT HARD AT ALL

## 2021-12-02 NOTE — PROGRESS NOTES
2021    TELEHEALTH EVALUATION -- Audio/Visual (During VZE-16 public health emergency)    Chief Complaint   Patient presents with    Hypertension     follow up        Jd Peace (:  1955) has requested an audio/video evaluation for the following concern(s):    HPI    Has history of thyroid cancer. Has been taking her medications regularly. NO sx of hyper/hypothyroidism. Recent labs show slightly suppressed TSH and normal T4/ TSH. Thyroglobulin is negative. Is still riding her exercise bike. Usually rides 20 minutes daily. HAs not had any chest pain, no SOB, no leg swelling. No palpitations. Her recent lipid panel does show elevations of the LDL but she has declined statins in the past.  She continues to try to watch her diet and keep her weight down. Review of Systems    Prior to Visit Medications    Medication Sig Taking?  Authorizing Provider   levothyroxine (SYNTHROID) 150 MCG tablet take 1 tablet by mouth every morning Yes Tessie Guardado MD   lisinopril (PRINIVIL;ZESTRIL) 20 MG tablet take 1 tablet by mouth once daily Yes Tessie Guardado MD   Flaxseed, Linseed, (GROUND FLAX SEEDS PO) Take by mouth daily Yes Historical Provider, MD   Omega-3 Fatty Acids (FISH OIL PO) Take 1 capsule by mouth daily Yes Historical Provider, MD   aspirin 81 MG tablet Take 81 mg by mouth daily Yes Historical Provider, MD   ferrous sulfate 325 (65 Fe) MG tablet Take 325 mg by mouth daily Yes Historical Provider, MD   Handicap Placard MISC by Does not apply route EXP: 2024  MD [place] by Does not apply route  Historical Provider, MD       Social History     Tobacco Use    Smoking status: Never Smoker    Smokeless tobacco: Never Used   Substance Use Topics    Alcohol use: No    Drug use: Not on file        No Known Allergies,   Past Medical History:   Diagnosis Date    Essential hypertension 9/15/2017    H/O fracture of right hip     History of thyroid cancer 9/15/2017    Hypothyroidism    ,   Past Surgical History:   Procedure Laterality Date    THYROIDECTOMY     ,   Social History     Tobacco Use    Smoking status: Never Smoker    Smokeless tobacco: Never Used   Substance Use Topics    Alcohol use: No    Drug use: Not on file   , History reviewed. No pertinent family history. ,   Immunization History   Administered Date(s) Administered    COVID-19, [place] Peter, PF, 30mcg/0.3mL 02/27/2021, 03/20/2021, 09/27/2021    Hepatitis A Adult (Havrix, Vaqta) 08/24/2018, 03/12/2019    Hepatitis A Ped/Adol (Havrix, Vaqta) 10/17/2018, 03/12/2019    Hepatitis B 01/17/2001, 02/14/2001, 07/18/2001    Influenza Virus Vaccine 08/16/2012, 08/09/2016, 08/10/2017    Influenza, High Dose (Fluzone 65 yrs and older) 10/17/2018, 08/15/2019, 08/18/2020    Influenza, [place], IM, PF (6 mo and older Fluzone, Flulaval, Fluarix, and 3 yrs and older Afluria) 08/05/2015, 08/10/2017, 08/18/2020    Influenza, [place], adjuvanted, 65 yrs +, IM, PF (Fluad) 08/18/2020, 08/18/2021    Influenza, Triv, inactivated, subunit, adjuvanted, IM (Fluad 65 yrs and older) 08/24/2018, 08/15/2019    MMR 03/26/2014    Pneumococcal Conjugate 13-valent ([id number]) 02/03/2015    Pneumococcal Polysaccharide ([id number]) 01/27/2010    Td vaccine (adult) 04/25/2001    Tdap (Boostrix, Adacel) 07/13/2012, 08/15/2019    Zoster Live (Zostavax) 09/30/2015    Zoster Recombinant (Shingrix) 03/20/2020, 06/16/2020   ,   Health Maintenance   Topic Date Due    Hepatitis C screen  Never done    Colon cancer screen colonoscopy  Never done    Breast cancer screen  Never done    DEXA (modify frequency per FRAX score)  Never done    Pneumococcal 65+ years Vaccine (2 of 2 - PPSV23) 04/03/2018    Annual Wellness Visit (AWV)  Never done    TSH testing  11/12/2022    Potassium monitoring  11/12/2022    Creatinine monitoring  11/12/2022    Lipid screen  11/12/2026    DTaP/Tdap/Td vaccine (3 - Td or Tdap) 08/15/2029    Flu vaccine  Completed    Shingles Vaccine  Completed    COVID-19 Vaccine  Completed    Hepatitis A vaccine  Aged Out    Hepatitis B vaccine  Aged Out    Hib vaccine  Aged Out    Meningococcal (ACWY) vaccine  Aged Out       PHYSICAL EXAMINATION:  [ INSTRUCTIONS:  \"[x]\" Indicates a positive item  \"[]\" Indicates a negative item  -- DELETE ALL ITEMS NOT EXAMINED]  Vital Signs: (As obtained by patient/caregiver or practitioner observation)  Patient-Reported Vitals 12/2/2021   Patient-Reported Systolic 496   Patient-Reported Diastolic 76         Constitutional: [x] Appears well-developed and well-nourished [x] No apparent distress      [] Abnormal-   Mental status  [x] Alert and awake  [x] Oriented to person/place/time [x]Able to follow commands      Eyes:  EOM    [x]  Normal  [] Abnormal-  Sclera  [x]  Normal  [] Abnormal -         Discharge [x]  None visible  [] Abnormal -    HENT:   [x] Normocephalic, atraumatic.   [] Abnormal   [x] Mouth/Throat: Mucous membranes are moist.     External Ears [x] Normal  [] Abnormal-     Neck: [x] No visualized mass     Pulmonary/Chest: [x] Respiratory effort normal.  [x] No visualized signs of difficulty breathing or respiratory distress        [] Abnormal-      Musculoskeletal:   [] Normal gait with no signs of ataxia         [x] Normal range of motion of neck        [] Abnormal-       Neurological:        [x] No Facial Asymmetry (Cranial nerve 7 motor function) (limited exam to video visit)          [x] No gaze palsy        [] Abnormal-         Skin:        [x] No significant exanthematous lesions or discoloration noted on facial skin         [] Abnormal-            Psychiatric:       [x] Normal Affect [] No Hallucinations        [] Abnormal-     Other pertinent observable physical exam findings-     Due to this being a TeleHealth encounter, evaluation of the following organ systems is limited: Vitals/Constitutional/EENT/Resp/CV/GI//MS/Neuro/Skin/Heme-Lymph-Imm. ASSESSMENT/PLAN:  1. Postoperative hypothyroidism  Asymptomatic and labs are at the appropriate levels for her current thyroid. With her previous history of thyroid cancer when I continue to keep her TSH just slightly subtherapeutic.  - levothyroxine (SYNTHROID) 150 MCG tablet; take 1 tablet by mouth every morning  Dispense: 90 tablet; Refill: 3    2. Essential hypertension  Asymptomatic and well controlled at this time no changes in her current blood pressure medications. 3. Hypokalemia  Labs show her potassium in a normal range no changes currently asymptomatic    4. History of thyroid cancer    - levothyroxine (SYNTHROID) 150 MCG tablet; take 1 tablet by mouth every morning  Dispense: 90 tablet; Refill: 3    5. Malignant neoplasm of thyroid gland ([facility] [address]  Insist on seeing in person in about 6 months this is been almost 3 years since she has been in the office. Return in about 6 months (around 6/2/2022) for IN PERSON visit. An  electronic signature was used to authenticate this note. --Carmen Tse MD on 12/5/2021 at 3:33 PM        Pursuant to the emergency declaration under the [facility], [facility] waiver authority and the [employer] Act, this Virtual  Visit was conducted, with patient's consent, to reduce the patient's risk of exposure to COVID-19 and provide continuity of care for an established patient. Patient location: Patient home  Provider location: Provider clinic  Services were provided through a video synchronous discussion virtually to substitute for in-person clinic visit.

## 2022-04-29 DIAGNOSIS — I10 ESSENTIAL HYPERTENSION: ICD-10-CM

## 2022-04-29 RX ORDER — LISINOPRIL 20 MG/1
TABLET ORAL
Qty: 30 TABLET | Refills: 0 | Status: SHIPPED | OUTPATIENT
Start: 2022-04-29 | End: 2022-10-19 | Stop reason: SDUPTHER

## 2022-04-29 NOTE — TELEPHONE ENCOUNTER
Claudia Gregg is requesting a refill on the following medication(s):  Requested Prescriptions     Pending Prescriptions Disp Refills    lisinopril (PRINIVIL;ZESTRIL) 20 MG tablet [Pharmacy Med Name: LISINOPRIL 20 MG TABLET] 30 tablet 5     Sig: take 1 tablet by mouth once daily       Last Visit Date (If Applicable):  84/4/1784    Next Visit Date:    Visit date not found

## 2022-05-25 DIAGNOSIS — I10 ESSENTIAL HYPERTENSION: ICD-10-CM

## 2022-05-25 RX ORDER — LISINOPRIL 20 MG/1
TABLET ORAL
Qty: 30 TABLET | Refills: 0 | OUTPATIENT
Start: 2022-05-25

## 2022-05-25 NOTE — TELEPHONE ENCOUNTER
[facility] is requesting a refill on the following medication(s):  Requested Prescriptions     Pending Prescriptions Disp Refills    lisinopril (PRINIVIL;ZESTRIL) 20 MG tablet [Pharmacy Med Name: LISINOPRIL 20 MG TABLET] 30 tablet 0     Sig: take 1 tablet by mouth once daily       Last Visit Date (If Applicable):  39/1/5616    Next Visit Date:    Visit date not found

## 2022-06-02 DIAGNOSIS — I10 ESSENTIAL HYPERTENSION: ICD-10-CM

## 2022-06-02 RX ORDER — LISINOPRIL 20 MG/1
TABLET ORAL
Qty: 30 TABLET | Refills: 0 | OUTPATIENT
Start: 2022-06-02

## 2022-06-02 NOTE — TELEPHONE ENCOUNTER
Roselyn Soliman is requesting a refill on the following medication(s):  Requested Prescriptions     Pending Prescriptions Disp Refills    lisinopril (PRINIVIL;ZESTRIL) 20 MG tablet [Pharmacy Med Name: LISINOPRIL 20 MG TABLET] 30 tablet 0     Sig: take 1 tablet by mouth once daily       Last Visit Date (If Applicable):  33/2/9314    Next Visit Date:    10/19/2022

## 2022-07-09 NOTE — TELEPHONE ENCOUNTER
Patient states she has enough pills to last her until the appointment in October. She verbalized understanding that Dr Jose Cruz will not do any further refills until she is seen. She declines appointment and wants to keep appointment in october. found 3 ticks on him full of blood, requesting doxycycline course

## 2022-10-19 ENCOUNTER — HOSPITAL ENCOUNTER (OUTPATIENT)
Age: 67
Setting detail: SPECIMEN
Discharge: HOME | End: 2022-10-19
Payer: MEDICARE

## 2022-10-19 ENCOUNTER — OFFICE VISIT (OUTPATIENT)
Dept: FAMILY MEDICINE CLINIC | Age: 67
End: 2022-10-19
Payer: MEDICARE

## 2022-10-19 VITALS
HEIGHT: 66 IN | DIASTOLIC BLOOD PRESSURE: 84 MMHG | HEART RATE: 100 BPM | OXYGEN SATURATION: 99 % | BODY MASS INDEX: 44.36 KG/M2 | WEIGHT: 276 LBS | SYSTOLIC BLOOD PRESSURE: 128 MMHG

## 2022-10-19 DIAGNOSIS — E66.01 MORBIDLY OBESE (HCC): ICD-10-CM

## 2022-10-19 DIAGNOSIS — Z85.850 HISTORY OF THYROID CANCER: ICD-10-CM

## 2022-10-19 DIAGNOSIS — E87.6 HYPOKALEMIA: ICD-10-CM

## 2022-10-19 DIAGNOSIS — C73 MALIGNANT NEOPLASM OF THYROID GLAND (HCC): ICD-10-CM

## 2022-10-19 DIAGNOSIS — E89.0 POSTOPERATIVE HYPOTHYROIDISM: ICD-10-CM

## 2022-10-19 DIAGNOSIS — I10 ESSENTIAL HYPERTENSION: ICD-10-CM

## 2022-10-19 DIAGNOSIS — I10 ESSENTIAL HYPERTENSION: Primary | ICD-10-CM

## 2022-10-19 DIAGNOSIS — R73.01 IFG (IMPAIRED FASTING GLUCOSE): ICD-10-CM

## 2022-10-19 LAB
ABSOLUTE EOS #: 0 K/UL (ref 0–0.4)
ABSOLUTE IMMATURE GRANULOCYTE: 0 K/UL (ref 0–0.3)
ABSOLUTE LYMPH #: 0.26 K/UL (ref 1–4.8)
ABSOLUTE MONO #: 0.39 K/UL (ref 0.1–1.2)
ALBUMIN SERPL-MCNC: 4.6 G/DL (ref 3.5–5.2)
ALBUMIN/GLOBULIN RATIO: 1.8 (ref 1–2.5)
ALP BLD-CCNC: 63 U/L (ref 35–104)
ALT SERPL-CCNC: 20 U/L (ref 5–33)
ANION GAP SERPL CALCULATED.3IONS-SCNC: 12 MMOL/L (ref 9–17)
AST SERPL-CCNC: 19 U/L
BASOPHILS # BLD: 0 % (ref 0–1)
BASOPHILS ABSOLUTE: 0 K/UL (ref 0–0.2)
BILIRUB SERPL-MCNC: 0.6 MG/DL (ref 0.3–1.2)
BUN BLDV-MCNC: 18 MG/DL (ref 8–23)
BUN/CREAT BLD: 26 (ref 9–20)
CALCIUM SERPL-MCNC: 9.7 MG/DL (ref 8.6–10.4)
CHLORIDE BLD-SCNC: 102 MMOL/L (ref 98–107)
CO2: 28 MMOL/L (ref 20–31)
CREAT SERPL-MCNC: 0.69 MG/DL (ref 0.5–0.9)
EOSINOPHILS RELATIVE PERCENT: 0 % (ref 1–7)
GFR SERPL CREATININE-BSD FRML MDRD: >60 ML/MIN/1.73M2
GLUCOSE BLD-MCNC: 120 MG/DL (ref 70–99)
HCT VFR BLD CALC: 50.9 % (ref 36.3–47.1)
HEMOGLOBIN: 16.5 G/DL (ref 11.9–15.1)
IMMATURE GRANULOCYTES: 0 %
LYMPHOCYTES # BLD: 4 % (ref 16–46)
MCH RBC QN AUTO: 30.1 PG (ref 25.2–33.5)
MCHC RBC AUTO-ENTMCNC: 32.4 G/DL (ref 25.2–33.5)
MCV RBC AUTO: 92.9 FL (ref 82.6–102.9)
MONOCYTES # BLD: 6 % (ref 4–11)
MORPHOLOGY: ABNORMAL
MORPHOLOGY: ABNORMAL
NRBC AUTOMATED: 0 PER 100 WBC
PDW BLD-RTO: 13.2 % (ref 11.8–14.4)
PLATELET # BLD: 208 K/UL (ref 138–453)
PMV BLD AUTO: 10.1 FL (ref 8.1–13.5)
POTASSIUM SERPL-SCNC: 4.6 MMOL/L (ref 3.7–5.3)
RBC # BLD: 5.48 M/UL (ref 3.95–5.11)
SEG NEUTROPHILS: 90 % (ref 43–77)
SEGMENTED NEUTROPHILS ABSOLUTE COUNT: 5.85 K/UL (ref 1.5–8.1)
SODIUM BLD-SCNC: 142 MMOL/L (ref 135–144)
THYROXINE, FREE: 1.7 NG/DL (ref 0.93–1.7)
TOTAL PROTEIN: 7.1 G/DL (ref 6.4–8.3)
TSH SERPL DL<=0.05 MIU/L-ACNC: 0.63 UIU/ML (ref 0.3–5)
WBC # BLD: 6.5 K/UL (ref 3.5–11.3)

## 2022-10-19 PROCEDURE — 84439 ASSAY OF FREE THYROXINE: CPT

## 2022-10-19 PROCEDURE — 85025 COMPLETE CBC W/AUTO DIFF WBC: CPT

## 2022-10-19 PROCEDURE — 1036F TOBACCO NON-USER: CPT | Performed by: FAMILY MEDICINE

## 2022-10-19 PROCEDURE — 80053 COMPREHEN METABOLIC PANEL: CPT

## 2022-10-19 PROCEDURE — G8427 DOCREV CUR MEDS BY ELIG CLIN: HCPCS | Performed by: FAMILY MEDICINE

## 2022-10-19 PROCEDURE — G8400 PT W/DXA NO RESULTS DOC: HCPCS | Performed by: FAMILY MEDICINE

## 2022-10-19 PROCEDURE — 99213 OFFICE O/P EST LOW 20 MIN: CPT

## 2022-10-19 PROCEDURE — G8417 CALC BMI ABV UP PARAM F/U: HCPCS | Performed by: FAMILY MEDICINE

## 2022-10-19 PROCEDURE — 3017F COLORECTAL CA SCREEN DOC REV: CPT | Performed by: FAMILY MEDICINE

## 2022-10-19 PROCEDURE — 84432 ASSAY OF THYROGLOBULIN: CPT

## 2022-10-19 PROCEDURE — 99214 OFFICE O/P EST MOD 30 MIN: CPT | Performed by: FAMILY MEDICINE

## 2022-10-19 PROCEDURE — G8484 FLU IMMUNIZE NO ADMIN: HCPCS | Performed by: FAMILY MEDICINE

## 2022-10-19 PROCEDURE — 84443 ASSAY THYROID STIM HORMONE: CPT

## 2022-10-19 PROCEDURE — 1123F ACP DISCUSS/DSCN MKR DOCD: CPT | Performed by: FAMILY MEDICINE

## 2022-10-19 RX ORDER — LISINOPRIL 20 MG/1
TABLET ORAL
Qty: 90 TABLET | Refills: 3 | Status: SHIPPED | OUTPATIENT
Start: 2022-10-19

## 2022-10-19 ASSESSMENT — PATIENT HEALTH QUESTIONNAIRE - PHQ9
1. LITTLE INTEREST OR PLEASURE IN DOING THINGS: 0
SUM OF ALL RESPONSES TO PHQ QUESTIONS 1-9: 0
SUM OF ALL RESPONSES TO PHQ9 QUESTIONS 1 & 2: 0
SUM OF ALL RESPONSES TO PHQ QUESTIONS 1-9: 0
2. FEELING DOWN, DEPRESSED OR HOPELESS: 0

## 2022-10-19 NOTE — PROGRESS NOTES
[address] [facility]  [address]  Dept: [phone]  Dept GCK:[phone]    Elvia Mejia is a 69 y.o. female who presents today for her medical conditions/complaints as notedbelow. Elvia Mejia is c/o of Hypertension        Assessment/Plan:     1. Essential hypertension  -     CBC with Auto Differential; Future  -     Comprehensive Metabolic Panel; Future  -     lisinopril (PRINIVIL;ZESTRIL) 20 MG tablet; take 1 tablet by mouth once daily, Disp-90 tablet, R-3Normal  2. History of thyroid cancer  -     T4, Free; Future  -     TSH; Future  -     Thyroglobulin; Future  -     levothyroxine (SYNTHROID) 150 MCG tablet; take 1 tablet by mouth every morning, Disp-90 tablet, R-3Normal  3. Postoperative hypothyroidism  -     T4, Free; Future  -     TSH; Future  -     levothyroxine (SYNTHROID) 150 MCG tablet; take 1 tablet by mouth every morning, Disp-90 tablet, R-3Normal  4. Hypokalemia  -     Comprehensive Metabolic Panel; Future  5. Morbidly obese ([address]  6. Malignant neoplasm of thyroid gland ([address]  7. IFG (impaired fasting glucose)    Joint is asymptomatic from her hypothyroidism. Also no symptoms from her history of thyroid cancer. Check annual labs to make sure that we are in the appropriate replacement zone. Would like her to be slightly supratherapeutic on the TSH. Reviewed again recommended cancer screenings at length with patient. She declines all cancer screenings today. Discussed diet and exercise again at length for her impaired fasting glucose. Encouraged to stay as physically active as possible. Routine screening labs ordered.     Lab Results   Component Value Date    WBC 6.5 10/19/2022    HGB 16.5 (H) 10/19/2022    HCT 50.9 (H) 10/19/2022     10/19/2022    CHOL 248 (H) 11/12/2021    TRIG 148 11/12/2021    HDL 57 11/12/2021    ALT 20 10/19/2022    AST 19 10/19/2022     10/19/2022    K 4.6 10/19/2022     10/19/2022 frequency per FRAX score)  Never done    Pneumococcal 65+ years Vaccine (3) 04/03/2018    Annual Wellness Visit (AWV)  Never done    Depression Screen  10/19/2023    Lipids  11/12/2026    DTaP/Tdap/Td vaccine (3 - Td or Tdap) 08/15/2029    Flu vaccine  Completed    Shingles vaccine  Completed    COVID-19 Vaccine  Completed    Hepatitis A vaccine  Aged Out    Hib vaccine  Aged Out    Meningococcal (ACWY) vaccine  Aged Out         Review of Systems    Objective:     /84 (Site: Left Upper Arm, Position: Sitting, Cuff Size: Large Adult)   Pulse 100   Ht 5' 6\" (1.676 m)   Wt 276 lb (125.2 kg)   SpO2 99%   BMI 44.55 kg/m²     Physical Exam  Vitals and nursing note reviewed. Constitutional:       Appearance: Normal appearance. She is well-developed. HENT:      Head: Normocephalic and atraumatic. Right Ear: External ear normal.      Left Ear: External ear normal.   Eyes:      Extraocular Movements: Extraocular movements intact. Conjunctiva/sclera: Conjunctivae normal.   Neck:      Thyroid: No thyromegaly. Vascular: No JVD. Cardiovascular:      Rate and Rhythm: Normal rate and regular rhythm. Pulses: Normal pulses. Heart sounds: Normal heart sounds. No murmur heard. No friction rub. No gallop. Pulmonary:      Effort: Pulmonary effort is normal. No respiratory distress. Breath sounds: Normal breath sounds. Abdominal:      Palpations: Abdomen is soft. There is no mass. Tenderness: There is no abdominal tenderness. Musculoskeletal:      Cervical back: Normal range of motion and neck supple. Right lower leg: No edema. Left lower leg: No edema. Lymphadenopathy:      Cervical: No cervical adenopathy. Skin:     General: Skin is warm. Capillary Refill: Capillary refill takes less than 2 seconds. Neurological:      General: No focal deficit present. Mental Status: She is alert and oriented to person, place, and time.    Psychiatric:         Mood and Affect: Mood normal.         Behavior: Behavior normal.         Thought Content: Thought content normal.         Judgment: Judgment normal.             Multiple labs and other testing may have been ordered which may not be completely evident from the above note due to system interface incompatibilities. Patient given educational materials - see patientinstructions. Discussed use, benefit, and side effects of prescribed medications. All patient questions answered. Pt voiced understanding. Reviewed health maintenance. Instructed to continue current medications, diet andexercise. Patient agreed with treatment plan. Follow up as directed.      (Please note that portions of this note were completed with a voice-recognition program. Efforts were made to edit the dictation but occasionally words are mis-transcribed.)    Electronically signed by Javed Alonso MD on 10/27/2022

## 2022-10-20 LAB — THYROGLOBULIN: <0.2 NG/ML (ref 0–63.4)

## 2022-10-23 RX ORDER — LEVOTHYROXINE SODIUM 0.15 MG/1
TABLET ORAL
Qty: 90 TABLET | Refills: 3 | Status: SHIPPED | OUTPATIENT
Start: 2022-10-23

## 2023-09-28 DIAGNOSIS — I10 ESSENTIAL HYPERTENSION: ICD-10-CM

## 2023-09-28 RX ORDER — LISINOPRIL 20 MG/1
TABLET ORAL
Qty: 90 TABLET | Refills: 0 | Status: SHIPPED | OUTPATIENT
Start: 2023-09-28

## 2023-09-28 NOTE — TELEPHONE ENCOUNTER
Edwar Cherry is requesting a refill on the following medication(s):  Requested Prescriptions     Pending Prescriptions Disp Refills    lisinopril (PRINIVIL;ZESTRIL) 20 MG tablet [Pharmacy Med Name: LISINOPRIL 20 MG TABLET] 90 tablet 0     Sig: take 1 tablet by mouth once daily       Last Visit Date (If Applicable):  09/48/0667    Next Visit Date:    10/26/2023      PCP out of the office. ON CALL please fill.

## 2023-10-26 ENCOUNTER — OFFICE VISIT (OUTPATIENT)
Dept: FAMILY MEDICINE CLINIC | Age: 68
End: 2023-10-26
Payer: MEDICARE

## 2023-10-26 VITALS
SYSTOLIC BLOOD PRESSURE: 136 MMHG | DIASTOLIC BLOOD PRESSURE: 64 MMHG | WEIGHT: 286.6 LBS | OXYGEN SATURATION: 90 % | HEIGHT: 66 IN | RESPIRATION RATE: 16 BRPM | HEART RATE: 114 BPM | BODY MASS INDEX: 46.06 KG/M2 | TEMPERATURE: 98.6 F

## 2023-10-26 DIAGNOSIS — E87.6 HYPOKALEMIA: ICD-10-CM

## 2023-10-26 DIAGNOSIS — M17.10 ARTHRITIS OF KNEE: ICD-10-CM

## 2023-10-26 DIAGNOSIS — I10 ESSENTIAL HYPERTENSION: Primary | ICD-10-CM

## 2023-10-26 DIAGNOSIS — E89.0 POSTOPERATIVE HYPOTHYROIDISM: ICD-10-CM

## 2023-10-26 DIAGNOSIS — E66.01 MORBIDLY OBESE (HCC): ICD-10-CM

## 2023-10-26 DIAGNOSIS — Z85.850 HISTORY OF THYROID CANCER: ICD-10-CM

## 2023-10-26 PROBLEM — C73 MALIGNANT NEOPLASM OF THYROID GLAND (HCC): Status: RESOLVED | Noted: 2017-10-09 | Resolved: 2023-10-26

## 2023-10-26 LAB
ALBUMIN/GLOBULIN RATIO: 1.5 G/DL
ALBUMIN: 4.4 G/DL (ref 3.5–5)
ALP BLD-CCNC: 57 UNITS/L (ref 38–126)
ALT SERPL-CCNC: 24 UNITS/L (ref 4–35)
ANION GAP SERPL CALCULATED.3IONS-SCNC: 9.2 MMOL/L (ref 12–16)
AST SERPL-CCNC: 30 UNITS/L (ref 14–36)
BASOPHILS %: 0.81 (ref 0–3)
BASOPHILS ABSOLUTE: 0.05 (ref 0–0.3)
BILIRUB SERPL-MCNC: 0.5 MG/DL (ref 0.2–1.3)
BUN BLDV-MCNC: 17 MG/DL (ref 7–17)
CALCIUM SERPL-MCNC: 9.4 MG/DL (ref 8.4–10.2)
CHLORIDE BLD-SCNC: 98 MMOL/L (ref 98–120)
CO2: 31 MMOL/L (ref 22–31)
CREAT SERPL-MCNC: 0.7 MG/DL (ref 0.5–1)
EOSINOPHILS %: 0.69 (ref 0–10)
EOSINOPHILS ABSOLUTE: 0.04 (ref 0–1.1)
GFR CALCULATED: > 60
GLOBULIN: 2.9 G/DL
GLUCOSE: 117 MG/DL (ref 65–105)
HCT VFR BLD CALC: 53.2 % (ref 37–47)
HEMOGLOBIN: 16.2 (ref 12–16)
LYMPHOCYTE %: 10.13 (ref 20–51.1)
LYMPHOCYTES ABSOLUTE: 0.63 (ref 1–5.5)
MCH RBC QN AUTO: 28.9 PG (ref 28.5–32.5)
MCHC RBC AUTO-ENTMCNC: 30.3 G/DL (ref 32–37)
MCV RBC AUTO: 95.3 FL (ref 80–94)
MONOCYTES %: 7.48 (ref 1.7–9.3)
MONOCYTES ABSOLUTE: 0.47 (ref 0.1–1)
NEUTROPHILS %: 80.89 (ref 42.2–75.2)
NEUTROPHILS ABSOLUTE: 5.07 (ref 2–8.1)
PDW BLD-RTO: 13.2 % (ref 8.5–15.5)
PLATELET # BLD: 256.2 THOU/MM3 (ref 130–400)
POTASSIUM SERPL-SCNC: 4.7 MMOL/L (ref 3.6–5)
RBC: 5.59 M/UL (ref 4.2–5.4)
SODIUM BLD-SCNC: 138 MMOL/L (ref 135–145)
T4 FREE: 1.26 NG/DL (ref 0.78–2.19)
TOTAL PROTEIN, SERUM: 7.3 G/DL (ref 6.3–8.2)
TSH SERPL DL<=0.05 MIU/L-ACNC: 1.44 MIU/ML (ref 0.49–4.67)
WBC: 6.3 THOU/ML3 (ref 4.8–10.8)

## 2023-10-26 PROCEDURE — 99214 OFFICE O/P EST MOD 30 MIN: CPT | Performed by: FAMILY MEDICINE

## 2023-10-26 PROCEDURE — 99397 PER PM REEVAL EST PAT 65+ YR: CPT | Performed by: FAMILY MEDICINE

## 2023-10-26 PROCEDURE — G8427 DOCREV CUR MEDS BY ELIG CLIN: HCPCS | Performed by: FAMILY MEDICINE

## 2023-10-26 PROCEDURE — G8400 PT W/DXA NO RESULTS DOC: HCPCS | Performed by: FAMILY MEDICINE

## 2023-10-26 PROCEDURE — 3078F DIAST BP <80 MM HG: CPT | Performed by: FAMILY MEDICINE

## 2023-10-26 PROCEDURE — 1090F PRES/ABSN URINE INCON ASSESS: CPT | Performed by: FAMILY MEDICINE

## 2023-10-26 PROCEDURE — 3074F SYST BP LT 130 MM HG: CPT | Performed by: FAMILY MEDICINE

## 2023-10-26 PROCEDURE — G8417 CALC BMI ABV UP PARAM F/U: HCPCS | Performed by: FAMILY MEDICINE

## 2023-10-26 PROCEDURE — G8484 FLU IMMUNIZE NO ADMIN: HCPCS | Performed by: FAMILY MEDICINE

## 2023-10-26 PROCEDURE — 1036F TOBACCO NON-USER: CPT | Performed by: FAMILY MEDICINE

## 2023-10-26 PROCEDURE — 3017F COLORECTAL CA SCREEN DOC REV: CPT | Performed by: FAMILY MEDICINE

## 2023-10-26 PROCEDURE — 1123F ACP DISCUSS/DSCN MKR DOCD: CPT | Performed by: FAMILY MEDICINE

## 2023-10-26 SDOH — ECONOMIC STABILITY: INCOME INSECURITY: HOW HARD IS IT FOR YOU TO PAY FOR THE VERY BASICS LIKE FOOD, HOUSING, MEDICAL CARE, AND HEATING?: NOT HARD AT ALL

## 2023-10-26 SDOH — ECONOMIC STABILITY: HOUSING INSECURITY
IN THE LAST 12 MONTHS, WAS THERE A TIME WHEN YOU DID NOT HAVE A STEADY PLACE TO SLEEP OR SLEPT IN A SHELTER (INCLUDING NOW)?: NO

## 2023-10-26 SDOH — ECONOMIC STABILITY: FOOD INSECURITY: WITHIN THE PAST 12 MONTHS, YOU WORRIED THAT YOUR FOOD WOULD RUN OUT BEFORE YOU GOT MONEY TO BUY MORE.: NEVER TRUE

## 2023-10-26 SDOH — ECONOMIC STABILITY: FOOD INSECURITY: WITHIN THE PAST 12 MONTHS, THE FOOD YOU BOUGHT JUST DIDN'T LAST AND YOU DIDN'T HAVE MONEY TO GET MORE.: NEVER TRUE

## 2023-10-26 ASSESSMENT — PATIENT HEALTH QUESTIONNAIRE - PHQ9
SUM OF ALL RESPONSES TO PHQ QUESTIONS 1-9: 0
2. FEELING DOWN, DEPRESSED OR HOPELESS: 0
SUM OF ALL RESPONSES TO PHQ QUESTIONS 1-9: 0
SUM OF ALL RESPONSES TO PHQ9 QUESTIONS 1 & 2: 0
1. LITTLE INTEREST OR PLEASURE IN DOING THINGS: 0

## 2023-10-26 NOTE — PROGRESS NOTES
[address] [facility] [place]  [address] [facility], [address] [facility] [place], [address]  Dept: [phone]  Dept UIT:[phone]    Fidelina Stroud is a 77 y.o. female who presents today for her medical conditions/complaints as notedbelow. Fidelina Stroud is c/o of Hypothyroidism (Patient would like blood work for her thyroid.)      Assessment/Plan:     1. Essential hypertension  2. History of thyroid cancer  -     Thyroglobulin; Future  3. Body mass index (BMI) 45.0-49.9, adult ([address] )  4. Hypokalemia  5. Postoperative hypothyroidism  -     Thyroglobulin; Future  6. Morbidly obese ([address])  7. Arthritis of knee  -     Handicap Placard MISC; Starting Thu 10/26/2023, Disp-1 each, R-0, PrintDX; leg arthritis exp 10/26/2028    Hypertension is asymptomatic well-controlled with current lisinopril dosage. Labs ordered to ensure no adverse med effects. Hypothyroidism is currently is asymptomatic. Continue with 150 mcg dosage. TSH T4 is ordered in addition to the thyroglobulin in light of her history of thyroid cancer. Again reviewed recommended screening testing including colon cancer screening breast cancer screening. Patient declines. Also encouraged updating her vaccines which she also declines. Encouraged regular healthy diet and continued regular exercise    Lab Results   Component Value Date    WBC 6.3 10/26/2023    HGB 16.2 (H) 10/26/2023    HCT 53.2 (H) 10/26/2023    .2 10/26/2023    CHOL 248 (H) 11/12/2021    TRIG 148 11/12/2021    HDL 57 11/12/2021    ALT 24 10/26/2023    AST 30 10/26/2023     10/26/2023    K 4.7 10/26/2023    CL 98 10/26/2023    CREATININE 0.7 10/26/2023    BUN 17 10/26/2023    CO2 31 10/26/2023    TSH 1.44 10/26/2023       Return in about 6 months (around 4/26/2024) for Medication recheck. Subjective:      HPI:     HPI    Has had a cough for the last 2 weeks or so--  has had this for the last 4 weeks. No fevers. No SOB. No leg swelling.  No head

## 2023-10-30 LAB — THYROGLOBULIN AB: <12 IU/ML (ref 0–40)

## 2023-11-02 ENCOUNTER — TELEPHONE (OUTPATIENT)
Dept: FAMILY MEDICINE CLINIC | Age: 68
End: 2023-11-02

## 2023-11-22 DIAGNOSIS — E89.0 POSTOPERATIVE HYPOTHYROIDISM: ICD-10-CM

## 2023-11-22 DIAGNOSIS — Z85.850 HISTORY OF THYROID CANCER: ICD-10-CM

## 2023-11-22 RX ORDER — LEVOTHYROXINE SODIUM 0.15 MG/1
TABLET ORAL
Qty: 90 TABLET | Refills: 3 | Status: SHIPPED | OUTPATIENT
Start: 2023-11-22

## 2023-11-22 NOTE — TELEPHONE ENCOUNTER
Edwar Cherry is requesting a refill on the following medication(s):  Requested Prescriptions     Pending Prescriptions Disp Refills    levothyroxine (SYNTHROID) 150 MCG tablet [Pharmacy Med Name: SYNTHROID 150 MCG TABLET] 90 tablet 3     Sig: take 1 tablet by mouth every morning       Last Visit Date (If Applicable):  62/20/3145    Next Visit Date:    Visit date not found

## 2023-12-23 DIAGNOSIS — I10 ESSENTIAL HYPERTENSION: ICD-10-CM

## 2023-12-26 RX ORDER — LISINOPRIL 20 MG/1
TABLET ORAL
Qty: 90 TABLET | Refills: 2 | Status: SHIPPED | OUTPATIENT
Start: 2023-12-26

## 2023-12-26 NOTE — TELEPHONE ENCOUNTER
Chema Vaughn is calling to request a refill on the following medication(s):  Requested Prescriptions     Pending Prescriptions Disp Refills    lisinopril (PRINIVIL;ZESTRIL) 20 MG tablet [Pharmacy Med Name: LISINOPRIL 20 MG TABLET] 90 tablet 0     Sig: take 1 tablet by mouth once daily       Last Visit Date (If Applicable):  55/43/6844    Next Visit Date:    Visit date not found

## 2024-09-09 DIAGNOSIS — I10 ESSENTIAL HYPERTENSION: ICD-10-CM

## 2024-09-10 RX ORDER — LISINOPRIL 20 MG/1
TABLET ORAL
Qty: 90 TABLET | Refills: 2 | Status: SHIPPED | OUTPATIENT
Start: 2024-09-10

## 2024-11-13 ENCOUNTER — OFFICE VISIT (OUTPATIENT)
Dept: FAMILY MEDICINE CLINIC | Age: 69
End: 2024-11-13
Payer: MEDICARE

## 2024-11-13 VITALS
SYSTOLIC BLOOD PRESSURE: 130 MMHG | HEART RATE: 106 BPM | DIASTOLIC BLOOD PRESSURE: 80 MMHG | BODY MASS INDEX: 45.96 KG/M2 | OXYGEN SATURATION: 94 % | HEIGHT: 66 IN | WEIGHT: 286 LBS

## 2024-11-13 DIAGNOSIS — Z85.850 HISTORY OF THYROID CANCER: ICD-10-CM

## 2024-11-13 DIAGNOSIS — Z13.220 ENCOUNTER FOR LIPID SCREENING FOR CARDIOVASCULAR DISEASE: ICD-10-CM

## 2024-11-13 DIAGNOSIS — Z13.6 ENCOUNTER FOR LIPID SCREENING FOR CARDIOVASCULAR DISEASE: ICD-10-CM

## 2024-11-13 DIAGNOSIS — I10 ESSENTIAL HYPERTENSION: ICD-10-CM

## 2024-11-13 DIAGNOSIS — E89.0 POSTOPERATIVE HYPOTHYROIDISM: Primary | ICD-10-CM

## 2024-11-13 LAB
ALBUMIN/GLOBULIN RATIO: 1.8 G/DL
ALBUMIN: 4.6 G/DL (ref 3.5–5)
ALP BLD-CCNC: 57 UNITS/L (ref 38–126)
ALT SERPL-CCNC: 20 UNITS/L (ref 4–35)
ANION GAP SERPL CALCULATED.3IONS-SCNC: 10.9 MMOL/L (ref 3–11)
AST SERPL-CCNC: 31 UNITS/L (ref 14–36)
BASOPHILS ABSOLUTE: 0.03 X10E3/?L (ref 0–0.3)
BASOPHILS RELATIVE PERCENT: 0.67 % (ref 0–3)
BILIRUB SERPL-MCNC: 0.6 MG/DL (ref 0.2–1.3)
BUN BLDV-MCNC: 14 MG/DL (ref 7–17)
CALCIUM SERPL-MCNC: 9.4 MG/DL (ref 8.4–10.2)
CHLORIDE BLD-SCNC: 101 MMOL/L (ref 98–120)
CHOLESTEROL, TOTAL: 223 MG/DL (ref 50–200)
CO2: 33 MMOL/L (ref 22–31)
CREAT SERPL-MCNC: 0.7 MG/DL (ref 0.5–1)
EOSINOPHILS ABSOLUTE: 0.04 X10E3/?L (ref 0–1.1)
EOSINOPHILS RELATIVE PERCENT: 0.9 % (ref 0–10)
GFR, ESTIMATED: > 60
GLOBULIN: 2.5 G/DL
GLUCOSE: 106 MG/DL (ref 65–105)
HCT VFR BLD CALC: 55.4 % (ref 37–47)
HEMOGLOBIN: 16.8 G/DL (ref 12–16)
LYMPHOCYTES ABSOLUTE: 0.63 X10E3/?L (ref 1–5.5)
LYMPHOCYTES RELATIVE PERCENT: 13.14 % (ref 20–51.1)
MCH RBC QN AUTO: 28.8 PG (ref 28.5–32.5)
MCHC RBC AUTO-ENTMCNC: 30.4 G/DL (ref 32–37)
MCV RBC AUTO: 94.7 FL (ref 80–94)
MISCELLANEOUS LAB TEST RESULT: NORMAL
MONOCYTES ABSOLUTE: 0.33 X10E3/?L (ref 0.1–1)
MONOCYTES RELATIVE PERCENT: 6.87 % (ref 1.7–9.3)
NEUTROPHILS ABSOLUTE: 3.74 X10E3/?L (ref 2–8.1)
NEUTROPHILS RELATIVE PERCENT: 78.43 % (ref 42.2–75.2)
PDW BLD-RTO: 13.2 % (ref 8.5–15.5)
PLATELET # BLD: 211.1 THOU/MM3 (ref 130–400)
POTASSIUM SERPL-SCNC: 3.9 MMOL/L (ref 3.6–5)
RBC # BLD: 5.85 M/UL (ref 4.2–5.4)
SODIUM BLD-SCNC: 141 MMOL/L (ref 135–145)
T4 FREE: 1.65 NG/DL (ref 0.78–2.19)
TOTAL PROTEIN: 7.1 G/DL (ref 6.3–8.2)
TSH SERPL DL<=0.05 MIU/L-ACNC: 0.49 MIU/ML (ref 0.49–4.67)
WBC # BLD: 4.8 THOU/ML3 (ref 4.8–10.8)

## 2024-11-13 PROCEDURE — 99213 OFFICE O/P EST LOW 20 MIN: CPT | Performed by: FAMILY MEDICINE

## 2024-11-13 SDOH — ECONOMIC STABILITY: INCOME INSECURITY: HOW HARD IS IT FOR YOU TO PAY FOR THE VERY BASICS LIKE FOOD, HOUSING, MEDICAL CARE, AND HEATING?: NOT HARD AT ALL

## 2024-11-13 SDOH — ECONOMIC STABILITY: FOOD INSECURITY: WITHIN THE PAST 12 MONTHS, YOU WORRIED THAT YOUR FOOD WOULD RUN OUT BEFORE YOU GOT MONEY TO BUY MORE.: NEVER TRUE

## 2024-11-13 SDOH — ECONOMIC STABILITY: FOOD INSECURITY: WITHIN THE PAST 12 MONTHS, THE FOOD YOU BOUGHT JUST DIDN'T LAST AND YOU DIDN'T HAVE MONEY TO GET MORE.: NEVER TRUE

## 2024-11-13 ASSESSMENT — PATIENT HEALTH QUESTIONNAIRE - PHQ9
1. LITTLE INTEREST OR PLEASURE IN DOING THINGS: NOT AT ALL
SUM OF ALL RESPONSES TO PHQ QUESTIONS 1-9: 0
SUM OF ALL RESPONSES TO PHQ9 QUESTIONS 1 & 2: 0
SUM OF ALL RESPONSES TO PHQ QUESTIONS 1-9: 0
2. FEELING DOWN, DEPRESSED OR HOPELESS: NOT AT ALL

## 2024-11-13 NOTE — PROGRESS NOTES
[place] [address] [facility] [address]  [place] [address]  Dept: [phone]  Dept Fax:[phone]    Kandi Mao is a 69 y.o. female who presents today for her medical conditions/complaints as notedbelow.        Assessment/Plan:     Assessment & Plan  1. Hypertension.  Her blood pressure is currently well-controlled at 130/80 mmHg. She reports that her home readings are 110/70 mmHg. She is advised to continue her current medication regimen and maintain her physical activity, including bicycling.    2. High iron levels.  She has been advised to stop taking iron supplements due to high iron levels. A complete blood count (CBC) has been ordered to recheck her blood count without the iron pills. She is encouraged to continue consuming iron-rich foods like spinach and blueberries.    3. Thyroid monitoring.  Thyroglobulin, TSH, and thyroglobulin antibody tests have been ordered to monitor her thyroid function. She reports no issues with chest pain or breathing.    4. Hyperlipidemia.  A lipid panel has been ordered to check her cholesterol levels. She has been informed that fasting is not required for this test.    5. Health Maintenance.  She has received the flu shot, the new COVID-19 vaccine, and the RSV vaccine. She declined the offer for a mammogram.    6. Code Status Discussion.  A discussion about code status, including full code versus DNR, was initiated. She is advised to discuss this with her  and provide a copy of her living will.        Assessment & Plan  Postoperative hypothyroidism    Orders:  •  Thyroglobulin; Future    History of thyroid cancer    Orders:  •  Thyroglobulin; Future    Body mass index (BMI) 45.0-49.9, adult    Orders:  •  Thyroglobulin; Future    Essential hypertension    Encounter for lipid screening for cardiovascular disease    Orders:  •  Lipid Panel; Future      Results      Lab Results   Component Value Date    WBC 4.8 11/13/2024    HGB

## 2024-11-14 NOTE — RESULT ENCOUNTER NOTE
Notify the labs are improved. The cholesterol is still high but not as high as previously.  I would also recommend making sure not taking any iron supplements. Foods such as spinach are OK but do not over do these.

## 2024-12-07 DIAGNOSIS — Z85.850 HISTORY OF THYROID CANCER: ICD-10-CM

## 2024-12-07 DIAGNOSIS — E89.0 POSTOPERATIVE HYPOTHYROIDISM: ICD-10-CM

## 2024-12-09 DIAGNOSIS — E89.0 POSTOPERATIVE HYPOTHYROIDISM: ICD-10-CM

## 2024-12-09 DIAGNOSIS — Z85.850 HISTORY OF THYROID CANCER: ICD-10-CM

## 2024-12-09 RX ORDER — LEVOTHYROXINE SODIUM 150 UG/1
TABLET ORAL
Qty: 90 TABLET | Refills: 3 | Status: SHIPPED | OUTPATIENT
Start: 2024-12-09 | End: 2024-12-09

## 2024-12-09 RX ORDER — LEVOTHYROXINE SODIUM 150 MCG
150 TABLET ORAL DAILY
Qty: 30 TABLET | Refills: 3 | Status: SHIPPED | OUTPATIENT
Start: 2024-12-09

## 2024-12-09 NOTE — TELEPHONE ENCOUNTER
Kandi Mao is requesting a refill on the following medication(s):  Requested Prescriptions     Pending Prescriptions Disp Refills    levothyroxine (SYNTHROID) 150 MCG tablet [Pharmacy Med Name: LEVOTHYROXINE 0.150MG (150MCG) TAB] 90 tablet 3     Sig: TAKE 1 TABLET BY MOUTH EVERY MORNING       Last Visit Date (If Applicable):  11/13/2024    Next Visit Date:    Visit date not found

## 2025-03-05 DIAGNOSIS — E89.0 POSTOPERATIVE HYPOTHYROIDISM: ICD-10-CM

## 2025-03-05 DIAGNOSIS — Z85.850 HISTORY OF THYROID CANCER: ICD-10-CM

## 2025-03-05 NOTE — TELEPHONE ENCOUNTER
Kandi Mao is requesting a refill on the following medication(s):  Requested Prescriptions     Pending Prescriptions Disp Refills    SYNTHROID 150 MCG tablet [Pharmacy Med Name: SYNTHROID 0.15MG (150MCG)TABLETS] 30 tablet 3     Sig: TAKE 1 TABLET BY MOUTH DAILY       Last Visit Date (If Applicable):  11/13/2024    Next Visit Date:    Visit date not found

## 2025-03-06 ENCOUNTER — TELEPHONE (OUTPATIENT)
Dept: FAMILY MEDICINE CLINIC | Age: 70
End: 2025-03-06

## 2025-03-06 RX ORDER — LEVOTHYROXINE SODIUM 150 MCG
150 TABLET ORAL DAILY
Qty: 90 TABLET | Refills: 2 | Status: SHIPPED | OUTPATIENT
Start: 2025-03-06

## 2025-03-06 NOTE — TELEPHONE ENCOUNTER
PT would like her synthroid for 90 Days instead of 30. Sent to [facility] in [place]

## 2025-03-07 DIAGNOSIS — I10 ESSENTIAL HYPERTENSION: ICD-10-CM

## 2025-03-10 NOTE — TELEPHONE ENCOUNTER
Kandi Mao is requesting a refill on the following medication(s):  Requested Prescriptions     Pending Prescriptions Disp Refills    lisinopril (PRINIVIL;ZESTRIL) 20 MG tablet [Pharmacy Med Name: LISINOPRIL 20MG TABLETS] 90 tablet 2     Sig: TAKE 1 TABLET BY MOUTH DAILY       Last Visit Date (If Applicable):  11/13/2024    Next Visit Date:    Visit date not found

## 2025-03-11 RX ORDER — LISINOPRIL 20 MG/1
TABLET ORAL
Qty: 90 TABLET | Refills: 1 | Status: SHIPPED | OUTPATIENT
Start: 2025-03-11

## 2025-09-02 DIAGNOSIS — I10 ESSENTIAL HYPERTENSION: ICD-10-CM

## 2025-09-03 RX ORDER — LISINOPRIL 20 MG/1
TABLET ORAL
Qty: 90 TABLET | Refills: 0 | Status: SHIPPED | OUTPATIENT
Start: 2025-09-03